# Patient Record
Sex: MALE | Race: BLACK OR AFRICAN AMERICAN | URBAN - METROPOLITAN AREA
[De-identification: names, ages, dates, MRNs, and addresses within clinical notes are randomized per-mention and may not be internally consistent; named-entity substitution may affect disease eponyms.]

---

## 2024-11-14 ENCOUNTER — OFFICE VISIT (OUTPATIENT)
Dept: URGENT CARE | Age: 29
End: 2024-11-14
Payer: COMMERCIAL

## 2024-11-14 VITALS
HEART RATE: 90 BPM | DIASTOLIC BLOOD PRESSURE: 85 MMHG | WEIGHT: 315 LBS | OXYGEN SATURATION: 99 % | RESPIRATION RATE: 16 BRPM | TEMPERATURE: 97.8 F | SYSTOLIC BLOOD PRESSURE: 141 MMHG

## 2024-11-14 DIAGNOSIS — J02.9 PHARYNGITIS, UNSPECIFIED ETIOLOGY: Primary | ICD-10-CM

## 2024-11-14 RX ORDER — AZITHROMYCIN 250 MG/1
TABLET, FILM COATED ORAL
Qty: 6 TABLET | Refills: 0 | Status: SHIPPED | OUTPATIENT
Start: 2024-11-14 | End: 2024-11-19

## 2024-11-14 ASSESSMENT — ENCOUNTER SYMPTOMS
COUGH: 1
SORE THROAT: 1

## 2024-11-14 NOTE — PROGRESS NOTES
Subjective   Patient ID: Jackson Moreno is a 28 y.o. male. They present today with a chief complaint of Sore Throat and Cough.    History of Present Illness    Sore Throat   Associated symptoms include coughing.   Cough  Associated symptoms include a sore throat.     This is a 28-year-old -American male presents today complaining of sore throat and cough for the past 2 weeks.  He denies any fever or chills.  Denies any production of sputum.  He denies any shortness of breath.  Past Medical History  Allergies as of 11/14/2024    (No Known Allergies)       (Not in a hospital admission)       No past medical history on file.    No past surgical history on file.     reports that he has never smoked. He has never used smokeless tobacco. He reports that he does not use drugs.    Review of Systems  Review of Systems   HENT:  Positive for sore throat.    Respiratory:  Positive for cough.    All other systems reviewed and are negative.                                 Objective    Vitals:    11/14/24 1600   BP: 141/85   Pulse: 90   Temp: 36.6 °C (97.8 °F)   TempSrc: Oral     No LMP for male patient.    Physical Exam  Patient is awake alert oriented x 3 in no acute distress.  Vital signs are stable.  Throat slightly erythematous.  Airway is patent.  Neck supple.  No trismus.  EACs are patent.  Cardiovascular is regular rate and rhythm.  Lungs are clear throughout  Procedures    Point of Care Test & Imaging Results from this visit  No results found for this visit on 11/14/24.   No results found.    Diagnostic study results (if any) were reviewed by King Andersen DO.    Assessment/Plan   Allergies, medications, history, and pertinent labs/EKGs/Imaging reviewed by King Andersen DO.     Medical Decision Making      Orders and Diagnoses  Diagnoses and all orders for this visit:  Pharyngitis, unspecified etiology  -     azithromycin (Zithromax) 250 mg tablet; Take 2 tabs (500 mg) by mouth today, than 1 daily  for 4 days.      Medical Admin Record      Patient disposition: Home    Electronically signed by King Andersen DO  4:13 PM